# Patient Record
Sex: FEMALE | Race: WHITE
[De-identification: names, ages, dates, MRNs, and addresses within clinical notes are randomized per-mention and may not be internally consistent; named-entity substitution may affect disease eponyms.]

---

## 2017-03-07 ENCOUNTER — HOSPITAL ENCOUNTER (EMERGENCY)
Dept: HOSPITAL 45 - C.EDB | Age: 27
Discharge: HOME | End: 2017-03-07
Payer: COMMERCIAL

## 2017-03-07 VITALS
HEIGHT: 67.01 IN | BODY MASS INDEX: 33.91 KG/M2 | BODY MASS INDEX: 33.91 KG/M2 | HEIGHT: 67.01 IN | WEIGHT: 216.05 LBS | WEIGHT: 216.05 LBS

## 2017-03-07 VITALS — SYSTOLIC BLOOD PRESSURE: 115 MMHG | HEART RATE: 61 BPM | DIASTOLIC BLOOD PRESSURE: 64 MMHG | OXYGEN SATURATION: 100 %

## 2017-03-07 VITALS — TEMPERATURE: 98.42 F

## 2017-03-07 DIAGNOSIS — R51: ICD-10-CM

## 2017-03-07 DIAGNOSIS — Z98.51: ICD-10-CM

## 2017-03-07 DIAGNOSIS — R00.2: Primary | ICD-10-CM

## 2017-03-07 DIAGNOSIS — R07.2: ICD-10-CM

## 2017-03-07 LAB
ALP SERPL-CCNC: 70 U/L (ref 45–117)
ALT SERPL-CCNC: 68 U/L (ref 12–78)
ANION GAP SERPL CALC-SCNC: 11 MMOL/L (ref 3–11)
AST SERPL-CCNC: 38 U/L (ref 15–37)
BASOPHILS # BLD: 0 K/UL (ref 0–0.2)
BASOPHILS NFR BLD: 0 %
BUN SERPL-MCNC: 8 MG/DL (ref 7–18)
BUN/CREAT SERPL: 10.9 (ref 10–20)
CALCIUM SERPL-MCNC: 9.4 MG/DL (ref 8.5–10.1)
CHLORIDE SERPL-SCNC: 106 MMOL/L (ref 98–107)
CKMB/CK RATIO: 1.1 (ref 0–3)
CO2 SERPL-SCNC: 24 MMOL/L (ref 21–32)
COMPLETE: YES
CREAT CL PREDICTED SERPL C-G-VRATE: 129.6 ML/MIN
CREAT SERPL-MCNC: 0.77 MG/DL (ref 0.6–1.2)
EOSINOPHIL NFR BLD AUTO: 186 K/UL (ref 130–400)
GLUCOSE SERPL-MCNC: 89 MG/DL (ref 70–99)
HCT VFR BLD CALC: 40.1 % (ref 37–47)
IG%: 0.2 %
IMM GRANULOCYTES NFR BLD AUTO: 39.9 %
INR PPP: 0.9 (ref 0.9–1.1)
LYMPHOCYTES # BLD: 2.35 K/UL (ref 1.2–3.4)
MAGNESIUM SERPL-MCNC: 2.1 MG/DL (ref 1.8–2.4)
MCH RBC QN AUTO: 30.9 PG (ref 25–34)
MCHC RBC AUTO-ENTMCNC: 35.4 G/DL (ref 32–36)
MCV RBC AUTO: 87.2 FL (ref 80–100)
MONOCYTES NFR BLD: 4.6 %
NEUTROPHILS # BLD AUTO: 0.8 %
NEUTROPHILS NFR BLD AUTO: 54.5 %
PARTIAL THROMBOPLASTIN RATIO: 1.1
PMV BLD AUTO: 10.3 FL (ref 7.4–10.4)
POINT OF CARE TROPONIN I: 0 NG/ML (ref 0–0.04)
POTASSIUM SERPL-SCNC: 3.7 MMOL/L (ref 3.5–5.1)
PREG INTERNAL NEGATIVE QC: (no result)
PREG INTERNAL POSITIVE QC: (no result)
PROTHROMBIN TIME: 10.1 SECONDS (ref 9–12)
RBC # BLD AUTO: 4.6 M/UL (ref 4.2–5.4)
SODIUM SERPL-SCNC: 141 MMOL/L (ref 136–145)
TSH SERPL-ACNC: 1.83 UIU/ML (ref 0.3–4.5)
WBC # BLD AUTO: 5.89 K/UL (ref 4.8–10.8)

## 2017-03-07 NOTE — EMERGENCY ROOM VISIT NOTE
History


Report prepared by Martinez:  Janice Miranda


Under the Supervision of:  Dr. Phil Mcmahan D.O.


First contact with patient:  16:53


Chief Complaint:  PALPITATIONS


Stated Complaint:  HEART PALPITATIONS, POUNDING, DIZZINESS





History of Present Illness


The patient is a 27 year old female who presents to the Emergency Room with 

complaints of episodes of intermittent heart palpitations starting 2 days PTA. 

The patient states that she was sitting on her computer and when she stood up 

she was lightheaded and had an episode of heart racing and since the episode 2 

days ago she has had a persistent headache and chest pain. The patient states 

today she had another episode of the heart racing along with nausea causing her 

to come to the ED. The patient states she has had similar episodes in the past 

and she did see her PCP which gave her medications for panic attacks. The 

patient states that she had similar headaches in the past but that they usually 

go away but that this headache has persisted. The patient states she also has 

some leg soreness with her symptoms but denies any leg pain or swelling. She 

denies any SOB or vomiting. She states she does not take birth control and has 

a history of a tubal ligation. She denies any chance of pregnancy and states 

that she is currently having her menstrual period. The patient states she has 

no history of thyroid problems and states the only medications she takes are 

the ones she was prescribed for her panic attacks. She states she took those 

medications when she experiencing these episodes but they did not resolve her 

symptoms.





   Source of History:  patient


   Onset:  2 days PTA


   Position:  chest


   Timing:  intermittent


   Associated Symptoms:  + chest pain, + headache, + nausea, No vomiting


Note:


Associated symptoms: leg soreness, lightheaded


Patient denies any leg pain or swelling.





Review of Systems


See HPI for pertinent positives & negatives. A total of 10 systems reviewed and 

were otherwise negative.





Past Medical & Surgical


Surgical Problems:


(1) History of tubal ligation








Family History





Adopted





Social History


Smoking Status:  Never Smoker


Alcohol Use:  none


Drug Use:  none


Marital Status:  


Housing Status:  lives with family


Occupation Status:  employed, student





Current/Historical Medications


Scheduled


Citalopram Hydrobromide (Citalopram Hydrobromide), 20 MG PO DAILY





Scheduled PRN


Lorazepam (Ativan), 0.5 MG PO TID PRN for Anxiety


Oxycodone Immediate Rel Tab (Roxicodone Ir), 1-2 TAB PO Q4H PRN for Severe Pain





Allergies


Coded Allergies:  


     [place] (Unverified  Allergy, Severe, FACIAL SWELLING, 6/4/16)


     Amoxicillin (Verified  Allergy, Mild, rash, 6/4/16)


     Penicillins (Verified  Allergy, Mild, HIVES, 6/4/16)


 RASH





Physical Exam


Vital Signs











  Date Time  Temp Pulse Resp B/P Pulse Ox O2 Delivery O2 Flow Rate FiO2


 


3/7/17 18:57  61 21 115/64 100   


 


3/7/17 17:17  72      


 


3/7/17 17:11     98 Room Air  


 


3/7/17 16:52 36.9 83 18 122/85 98 Room Air  











Physical Exam


GENERAL:  Patient is awake, alert, and in no acute distress. Patient is resting 

comfortably and showing no signs of anxiety


EYES: The conjunctivae are clear.  The pupils are round and reactive. 


EARS, NOSE, MOUTH AND THROAT: The nose is without any evidence of any 

deformity. Mucous membranes are moist tongue is midline 


NECK: The neck is nontender and supple.


RESPIRATORY: Normal respiratory effort is noted there is no evidence of 

wheezing rhonchi or rales


CARDIOVASCULAR:  Tachycardic with regular rhythm, no definite murmur noted to 

auscultation.


GASTROINTESTINAL: The abdomen is soft. Bowel sounds are present in all 

quadrants. Abdomen is nontender


MUSCULOSKELETAL/EXTREMITIES: There is no evidence of gross deformity full range 

of motion is noted in the hips and shoulders


SKIN: There is no obvious evidence of any rash. There are no petechiae, pallor 

or cyanosis noted. 


NEUROLOGIC:  Patient is awake alert and oriented x3 strength is symmetric 

patellar reflexes are 2+ bilaterally





Medical Decision & Procedures


ER Provider


Diagnostic Interpretation:


X-ray results as stated below per interpretation by me and the radiologist. 





CHEST ONE VIEW PORTABLE





CLINICAL HISTORY: ABDOMINAL PAIN/GI pain. Nausea.





COMPARISON STUDY:  1/1/2014





FINDINGS: The bones soft tissues and hemidiaphragms are normal. The


cardiomediastinal silhouette is normal. The lungs are clear. The pulmonary


vasculature is normal. 





IMPRESSION:  Negative chest. 














Electronically signed by:  Mikhail Fox M.D.


3/7/2017 5:15 PM





Dictated Date/Time:  3/7/2017 5:14 PM





CT results as stated below per my review and radiologist interpretation. 





HEAD CT NONCONTRAST





CT DOSE: 537.48 mGy.cm





HISTORY: Mental status change  HA





TECHNIQUE: Multiaxial CT images of the head were performed without the use of


intravenous contrast.





Comparison: None.





Findings: The paranasal sinuses and mastoid air cells are clear. The calvarium


and skull base are intact. The ventricles and sulci are within normal limits.


There is no mass, hematoma, midline shift, or acute infarct.





Impression:


No acute intracranial abnormality. 











Electronically signed by:  Mikhail Fox M.D.


3/7/2017 5:37 PM





Dictated Date/Time:  3/7/2017 5:36 PM





Laboratory Results


3/7/17 17:20








Red Blood Count 4.60, Mean Corpuscular Volume 87.2, Mean Corpuscular Hemoglobin 

30.9, Mean Corpuscular Hemoglobin Concent 35.4, Mean Platelet Volume 10.3, 

Neutrophils (%) (Auto) 54.5, Lymphocytes (%) (Auto) 39.9, Monocytes (%) (Auto) 

4.6, Eosinophils (%) (Auto) 0.8, Basophils (%) (Auto) 0.0, Neutrophils # (Auto) 

3.21, Lymphocytes # (Auto) 2.35, Monocytes # (Auto) 0.27, Eosinophils # (Auto) 

0.05, Basophils # (Auto) 0.00





3/7/17 17:20

















Test


  3/7/17


17:20 3/7/17


17:26


 


White Blood Count


  5.89 K/uL


(4.8-10.8) 


 


 


Red Blood Count


  4.60 M/uL


(4.2-5.4) 


 


 


Hemoglobin


  14.2 g/dL


(12.0-16.0) 


 


 


Hematocrit 40.1 % (37-47)  


 


Mean Corpuscular Volume


  87.2 fL


() 


 


 


Mean Corpuscular Hemoglobin


  30.9 pg


(25-34) 


 


 


Mean Corpuscular Hemoglobin


Concent 35.4 g/dl


(32-36) 


 


 


Platelet Count


  186 K/uL


(130-400) 


 


 


Mean Platelet Volume


  10.3 fL


(7.4-10.4) 


 


 


Neutrophils (%) (Auto) 54.5 %  


 


Lymphocytes (%) (Auto) 39.9 %  


 


Monocytes (%) (Auto) 4.6 %  


 


Eosinophils (%) (Auto) 0.8 %  


 


Basophils (%) (Auto) 0.0 %  


 


Neutrophils # (Auto)


  3.21 K/uL


(1.4-6.5) 


 


 


Lymphocytes # (Auto)


  2.35 K/uL


(1.2-3.4) 


 


 


Monocytes # (Auto)


  0.27 K/uL


(0.11-0.59) 


 


 


Eosinophils # (Auto)


  0.05 K/uL


(0-0.5) 


 


 


Basophils # (Auto)


  0.00 K/uL


(0-0.2) 


 


 


RDW Standard Deviation


  39.6 fL


(36.4-46.3) 


 


 


RDW Coefficient of Variation


  12.4 %


(11.5-14.5) 


 


 


Immature Granulocyte % (Auto) 0.2 %  


 


Immature Granulocyte # (Auto)


  0.01 K/uL


(0.00-0.02) 


 


 


Prothrombin Time


  10.1 SECONDS


(9.0-12.0) 


 


 


Prothromb Time International


Ratio 0.9 (0.9-1.1) 


  


 


 


Activated Partial


Thromboplast Time 27.8 SECONDS


(21.0-31.0) 


 


 


Partial Thromboplastin Ratio 1.1  


 


Anion Gap


  11.0 mmol/L


(3-11) 


 


 


Est Creatinine Clear Calc


Drug Dose 129.6 ml/min 


  


 


 


Estimated GFR (


American) 120.9 


  


 


 


Estimated GFR (Non-


American 104.3 


  


 


 


BUN/Creatinine Ratio 10.9 (10-20)  


 


Calcium Level


  9.4 mg/dl


(8.5-10.1) 


 


 


Magnesium Level


  2.1 mg/dl


(1.8-2.4) 


 


 


Total Bilirubin


  0.4 mg/dl


(0.2-1) 


 


 


Direct Bilirubin


  < 0.1 mg/dl


(0-0.2) 


 


 


Aspartate Amino Transf


(AST/SGOT) 38 U/L (15-37) 


  


 


 


Alanine Aminotransferase


(ALT/SGPT) 68 U/L (12-78) 


  


 


 


Alkaline Phosphatase


  70 U/L


() 


 


 


Total Creatine Kinase


  110 U/L


() 


 


 


Creatine Kinase MB


  1.2 ng/ml


(0.5-3.6) 


 


 


Creatine Kinase MB Ratio 1.1 (0-3.0)  


 


Total Protein


  7.5 gm/dl


(6.4-8.2) 


 


 


Albumin


  3.8 gm/dl


(3.4-5.0) 


 


 


Lipase


  150 U/L


() 


 


 


Thyroid Stimulating Hormone


(TSH) 1.830 uIu/ml


(0.300-4.500) 


 


 


Free Thyroxine


  1.11 ng/dl


(0.80-1.60) 


 


 


Human Chorionic Gonadotropin,


Qual NEG (NEG) 


  


 


 


Bedside D-Dimer


  


  130 ng/mlFEU


(0-450)


 


Bedside Troponin I


  


  0.000 ng/ml


(0-0.045)





Laboratory results per my review.





Medications Administered











 Medications


  (Trade)  Dose


 Ordered  Sig/Nicolasa


 Route  Start Time


 Stop Time Status Last Admin


Dose Admin


 


 Ketorolac


 Tromethamine 30 mg  30 mg  NOW  STAT


 IV  3/7/17 17:00


 3/7/17 17:03 DC 3/7/17 17:18


30 MG


 


 Sodium Chloride  1,000 ml @ 


 999 mls/hr  Q1H1M STAT


 IV  3/7/17 17:00


 3/7/17 18:01 DC 3/7/17 17:18


999 MLS/HR


 


 Promethazine HCl/


 Sodium Chloride


  (Phenergan Inj/


 Nss 50ml)  50.5 ml @ 


 204 mls/hr  NOW  STAT


 IV  3/7/17 17:00


 3/7/17 17:14 DC 3/7/17 17:18


204 MLS/HR











ECG


Indication:  chest pain


Rate (beats per minute):  75


Rhythm:  normal sinus


Findings:  no ectopy, other (No ST segment abnormalities )


Comparison ECG Date:  no prior available





ED Course


1656: The patient was evaluated in room B2. A complete history and physical 

examination were performed. 





1700: Ordered Promethazine HCl 12.5 mg/ Sodium Chloride 50.5 ml @ 204 mls/hr IV

, NSS 1,000 ml @ 999 mls/hr IV, Toradol Inj 30 mg IV. 





1804: Upon reevaluation, the patient is resting comfortably. I discussed the 

results and treatment plan with her. She verbalized agreement of the treatment 

plan. The patient was discharged home.





Medical Decision


Prior records/ancillary studies reviewed.


Triage Nursing notes reviewed





The patient's history was concerning for palpitations.


Differential diagnosis:


Etiologies such as premature contractions, electrolyte abnormality, cardiac 

dysrhythmia, thyroid dysfunction, pulmonary embolism, infection, 

gastrointestinal, as well as others were entertained.





The patient is a 27-year-old female who presented to the emergency department 

for an evaluation of palpitations and chest pain. The patient states that she's 

been having symptoms for the last few days. She also has been extrinsic left-

sided headache which is very significant. The patient not have meningismus or 

fever. She did not have any focal neurologic deficit. Her EKG did not show any 

acute ST segment abnormalities in her cardiac biomarkers were negative despite 

having ongoing pain through the day. The patient was treated with IV fluids IV 

pain medicine and IV antiemetics. I discussed patient's laboratory and 

radiographic studies with her. She was encouraged to rest and avoid any 

strenuous activity. She was encouraged to continue all medications as 

prescribed. She has a follow-up appointment scheduled with her primary care 

physician tomorrow and she was encouraged to keep this appointment and discuss 

the possibility that she may require further studies such as an echocardiogram 

or a Holter monitor to further evaluate the cause of her symptoms. She was also 

encouraged to return to the emergency department immediately if symptoms change 

worsen or the need arises.





Impression





 Primary Impression:  


 Heart palpitations


 Additional Impressions:  


 Acute headache


 Substernal chest pain





Scribe Attestation


The scribe's documentation has been prepared under my direction and personally 

reviewed by me in its entirety. I confirm that the note above accurately 

reflects all work, treatment, procedures, and medical decision making performed 

by me.





Departure Information


Dispostion


Home / Self-Care





Prescriptions





Oxycodone Immediate Rel Tab (ROXICODONE IR) 5 Mg Tab


1-2 TAB PO Q4H Y for Severe Pain, #24 TAB


   Prov: Phil Mcmahan, DO         3/7/17





Referrals


SILVESTRE West M.D. (MEDICAL) (PCP)





Forms


HOME CARE DOCUMENTATION FORM,                                                 

               IMPORTANT VISIT INFORMATION, WORK / SCHOOL INSTRUCTIONS





Patient Instructions


My [facility]





Additional Instructions





Follow-up with your family  tomorrow as scheduled. Rest and avoid any 

strenuous activity. Drink plenty of clear liquids. Return to the emergency 

department if symptoms worsen or if need arises. Discuss with your family 

doctor the possibility that you may require an echocardiogram or a Holter 

monitor to further evaluate the cause of your symptoms. Continue to use Motrin 

and Tylenol as directed for pain.





Problem Qualifiers

## 2017-03-07 NOTE — DIAGNOSTIC IMAGING REPORT
HEAD CT NONCONTRAST



CT DOSE: 537.48 mGy.cm



HISTORY: Mental status change  HA



TECHNIQUE: Multiaxial CT images of the head were performed without the use of

intravenous contrast.



Comparison: None.



Findings: The paranasal sinuses and mastoid air cells are clear. The calvarium

and skull base are intact. The ventricles and sulci are within normal limits.

There is no mass, hematoma, midline shift, or acute infarct.



Impression:

No acute intracranial abnormality. 







Electronically signed by:  Mikhail Fox M.D.

3/7/2017 5:37 PM



Dictated Date/Time:  3/7/2017 5:36 PM

## 2017-03-07 NOTE — DIAGNOSTIC IMAGING REPORT
CHEST ONE VIEW PORTABLE



CLINICAL HISTORY: ABDOMINAL PAIN/GI pain. Nausea.



COMPARISON STUDY:  1/1/2014



FINDINGS: The bones soft tissues and hemidiaphragms are normal. The

cardiomediastinal silhouette is normal. The lungs are clear. The pulmonary

vasculature is normal. 



IMPRESSION:  Negative chest. 









Electronically signed by:  Mikhail Fox M.D.

3/7/2017 5:15 PM



Dictated Date/Time:  3/7/2017 5:14 PM

## 2017-05-28 ENCOUNTER — HOSPITAL ENCOUNTER (EMERGENCY)
Dept: HOSPITAL 45 - C.EDB | Age: 27
Discharge: HOME | End: 2017-05-28
Payer: SELF-PAY

## 2017-05-28 VITALS — SYSTOLIC BLOOD PRESSURE: 116 MMHG | HEART RATE: 83 BPM | DIASTOLIC BLOOD PRESSURE: 74 MMHG | OXYGEN SATURATION: 95 %

## 2017-05-28 VITALS
HEIGHT: 67.01 IN | WEIGHT: 224.21 LBS | HEIGHT: 67.01 IN | BODY MASS INDEX: 35.19 KG/M2 | BODY MASS INDEX: 35.19 KG/M2 | WEIGHT: 224.21 LBS

## 2017-05-28 VITALS — TEMPERATURE: 98.24 F

## 2017-05-28 DIAGNOSIS — S33.5XXA: Primary | ICD-10-CM

## 2017-05-28 DIAGNOSIS — M54.5: ICD-10-CM
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
## 2017-05-28 NOTE — EMERGENCY ROOM VISIT NOTE
History


First contact with patient:  18:28


Chief Complaint:  BACK PAIN


Stated Complaint:  HURT LOWER BACK ON CANOE TRIP,CAN'T STAND STRAIGHT





History of Present Illness


The patient is a 27 year old female who presents to the Emergency Room via 

private vehicle accompanied by  with complaints of "her lower back on 

canoe trip, can't stand straight".  The patient states that yesterday morning 

around 8 AM she was packing up from a canoe trip, he may for the next trip when 

she stood up and felt pain in the low back.  She states that she stood up 

quickly.  She states that the pain currently is an 8/10.  There is been no 

trauma or injury.  She is tried Tylenol and Vicodin without relief.  She denies 

any urinary symptoms.  She denies any lower extremity weakness, bowel or 

bladder incontinence, numbness or tingling in the genital region, history of 

blood clots, speech troubles, abdominal pain, fevers, chills, recent troubles 

or fall.





Review of Systems


A complete 6-point Review of Systems was discussed with the patient, with 

pertinent positives and negatives listed in the History of Present Illness. All 

remaining Review of Systems questions can be considered negative unless 

otherwise specified.





Past Medical/Surgical History


Medical Problems:


(1) No significant past medical history


Surgical Problems:


(1) History of cholecystectomy


(2) History of tubal ligation








Family History





Adopted





Social History


Smoking Status:  Never Smoker


Alcohol Use:  none


Drug Use:  none


Marital Status:  


Housing Status:  lives with family


Occupation Status:  employed, student





Current/Historical Medications


Scheduled


Cyclobenzaprine Hcl (Flexeril), 10 MG PO TID





Scheduled PRN


Oxycodone Ir (Roxicodone Ir), 1-2 TAB PO Q4H PRN for Pain





Allergies


Coded Allergies:  


     [place] (Unverified  Allergy, Severe, FACIAL SWELLING, 5/28/17)


     Amoxicillin (Verified  Allergy, Mild, rash, 5/28/17)


     Penicillins (Verified  Allergy, Mild, HIVES, 5/28/17)


 RASH





Physical Exam


Vital Signs











  Date Time  Temp Pulse Resp B/P Pulse Ox O2 Delivery O2 Flow Rate FiO2


 


5/28/17 20:39  83 18 116/74 95 Room Air  


 


5/28/17 18:19 36.8 84 18 127/77 97 Room Air  











Physical Exam


VITAL SIGNS - Vital signs and nursing notes were reviewed.  Patient is afebrile

, normotensive, non-tachycardic and is saturating well on room air 97%.


GENERAL -27-year-old female appearing her stated age who is in no acute 

distress. Communicates well with provider and answers questions appropriately.


SKIN - Without rashes.  The skin overlying the lumbar spine is unremarkable.


HEAD - NC/AT.


LUNGS - Chest wall symmetric without accessory muscle use, intercostals 

retractions, or central cyanosis. Normal vesicular breath sounds CTA B/L. No 

wheezes, rales, or rhonchi appreciated.


CARDIAC - RRR with S1/S2. No murmur, rubs, or gallops appreciated. 


ABDOMEN - Abdominal contour without pulsations or visible masses. BS 

normoactive all four quadrants. No tenderness, palpable masses, 

hepatosplenomegaly, or ascites noted.


EXTREMITIES - No clubbing or peripheral cyanosis. No pretibial edema present.  +

5/5 strength noted in UE/LE bilaterally.


MUSCULOSKELETAL: There is tenderness to palpation overlying the inferior 

paraspinous musculature of the lumbar spine.  No bony deformity noted to 

inspection.


NEUROLOGIC - Cranial nerves II through XII grossly intact.  Patellar reflexes +2

/4.


PSYCH -  Pt is very pleasant and interacts well with examiner.





Medical Decision & Procedures


Medications Administered











 Medications


  (Trade)  Dose


 Ordered  Sig/Nicolasa


 Route  Start Time


 Stop Time Status Last Admin


Dose Admin


 


 Morphine Sulfate


  (MoRPHine


 SULFATE INJ)  10 mg  NOW  STAT


 IM  5/28/17 18:41


 5/28/17 18:44 DC 5/28/17 18:54


10 MG


 


 Ondansetron HCl


  (Zofran Odt)  4 mg  NOW  STAT


 PO  5/28/17 18:41


 5/28/17 18:44 DC 5/28/17 18:54


4 MG


 


 Dexamethasone


 Sodium Phosphate


  (Decadron Inj)  10 mg  NOW  STAT


 IM  5/28/17 18:41


 5/28/17 18:44 DC 5/28/17 18:54


10 MG


 


 Cyclobenzaprine


 HCl


  (Flexeril Tab)  10 mg  NOW  STAT


 PO  5/28/17 18:54


 5/28/17 18:55 DC 5/28/17 19:01


10 MG


 


 Miscellaneous


 Medication


  (Gi Cocktail)  24 ml  NOW  STAT


 PO  5/28/17 19:41


 5/28/17 19:42 DC 5/28/17 19:46


24 ML


 


 Al Hydroxide/Mg


 Hydroxide


  (Maalox Susp)  30 ml  STK-MED ONCE


 .ROUTE  5/28/17 19:44


 5/28/17 19:45 DC 5/28/17 19:46


30 ML


 


 Lidocaine HCl


  (Viscous


 Lidocaine 2% Soln)  20 ml  STK-MED ONCE


 .ROUTE  5/28/17 19:44


 5/28/17 19:45 DC 5/28/17 19:46


20 ML


 


 Oxycodone HCl


  (Roxicodone


 Immediate Rel 5MG


 Home Pack)  1 homepack  UD  STAT


 PO  5/28/17 20:28


 5/28/17 20:29 DC 5/28/17 20:39


1 HOMEPACK


 


 Cyclobenzaprine


 HCl


  (FLEXERIL 10MG


 Home Pack)  1 homepack  UD  STAT


 PO  5/28/17 20:28


 5/28/17 20:29 DC 5/28/17 20:39


1 HOMEPACK











Medical Decision


Patient was seen and evaluated as above.  After obtaining a thorough history 

and physical examination radiographs was obtained the lumbar spine.  She was 

treated symptomatically with 10 mg of morphine intramuscularly, 4 mg of Zofran 

for her potential nausea from the morphine followed by 10 mg of Decadron IM.  

The patient is likely experiencing lumbar strain, with minimal lumbar 

radiculopathy bilaterally.  She was reevaluated and was feeling better, but did 

note that she was experiencing epigastric and chest pain.  This is similar to 

her previous pain of which she will reflux.  She was given a GI cocktail and 

the pain was relieved.  I do not suspect any cardiac or emergent lung etiology.

  The patient has time appears stable for discharge, and the radiograph results 

as above do not reveal any acute process.  Patient was educated upon these 

findings.  She is to follow-up with her family doctor, or spine specialist if 

the need arises.  She was educated upon worrisome symptoms which to return, had 

questions prior to discharge, and was discharged home in good condition.  She 

will be given Flexeril, and OxyIR for pain.





In evaluation treatment this patient following differential diagnoses were 

entertained: Cauda equina syndrome, Lumbar Strain, fracture, subluxation, 

abscess, meningitis, among others.





[place] Drug Monitoring Program


Search Results:  patient reviewed within database, no issues identified





Impression





 Primary Impression:  


 Strain of lumbar region


 Additional Impression:  


 Low back pain radiating to both legs





Departure Information


Dispostion


Home / Self-Care





Condition


GOOD





Prescriptions





Cyclobenzaprine Hcl (FLEXERIL) 10 Mg Tab


10 MG PO TID, #15 TAB


   Prov: Bryon Guajardo PA-C         5/28/17 


Oxycodone Ir (Roxicodone Ir) 5 Mg Tab


1-2 TAB PO Q4H Y for Pain, #20 TAB


   For Initial Treatment


   Prov: Bryon Guajardo PA-C         5/28/17





Referrals


SILVESTRE West M.D. (MEDICAL) (PCP)








Navneet Jacobo, DO





Patient Instructions


My [facility]





Additional Instructions





You have been treated in the Emergency Department for Back Pain. You have 

received pain medicine in the emergency department which impairs your ability 

to operate a vehicle. It is illegal for you to drive after receiving these 

medicines. 





You have been prescribed Oxy IR to be used for pain control. This is a narcotic 

medication. You cannot drive or consume alcohol while on this medicine. This 

medicine should only be used for pain that cannot be controlled with over-the-

counter pain medicines.





You have been prescribed Flexeril (cyclobenzaprine) 1 tabs orally, three times 

per day. Do NOT exceed 30 mg (6 tabs) per day. Take your first dose at bedtime 

as it can make you drowsy. Always take all medications as prescribed.





For pain control, you can use the following over-the-counter medicines (if >11 yo):





- Regular strength (325mg/tab) Tylenol (acetaminophen) 2 tabs every 4-6 hours 

as needed. Do not exceed 12 tablets in a 24 hour period. Avoid taking more than 

3 grams (3000 mg) of Tylenol per day. This includes any other sources of 

acetaminophen you may take on a regular basis.





- Regular strength (200 mg/tab) Advil (ibuprofen) 1-2 tabs every 4-6 hours as 

needed. Do not exceed a dose of 3200 mg per day.





If this is an acute injury, ice can be applied to the area of pain for the 

first 3 days to help decrease pain and inflammation. After the first 3 days, a 

heating pad can be used over the area for continued soothing relief.





You should schedule a follow-up appointment in 2-3 days with your Primary Care 

Provider for further evaluation and treatment of your back pain. If needed, you 

may follow with a back specialist (Dr. Jacobo) by calling their office as soon 

as possible.





Return to the Emergency Department if your current symptoms worsen despite 

treatment course outlined above, or if you develop any of the following symptoms

: intractable pain despite aforementioned treatment course, loss of control of 

your bowel or bladder, numbness or tingling in your groin, or development of a 

fever.





Please return to the emergency department with any new/concerning symptoms.





Problem Qualifiers

## 2017-05-28 NOTE — DIAGNOSTIC IMAGING REPORT
LUMBAR SPINE 5 VIEWS



HISTORY:      Low back pain, no injury



COMPARISON: None.



FINDINGS: There is no fracture.  No subluxation. Mild disc space narrowing at

L5-S1. Remaining disc spaces are preserved. Prior cholecystectomy. The sacrum is

unremarkable.



IMPRESSION:  

No fracture or subluxation within the lumbar spine. Mild degenerative disc

disease at L5-S1.







Electronically signed by:  Lobo Looney M.D.

5/28/2017 7:37 PM



Dictated Date/Time:  5/28/2017 7:34 PM

## 2017-08-22 ENCOUNTER — HOSPITAL ENCOUNTER (EMERGENCY)
Dept: HOSPITAL 45 - C.EDB | Age: 27
Discharge: HOME | End: 2017-08-22
Payer: SELF-PAY

## 2017-08-22 VITALS
SYSTOLIC BLOOD PRESSURE: 105 MMHG | DIASTOLIC BLOOD PRESSURE: 74 MMHG | TEMPERATURE: 98.06 F | HEART RATE: 79 BPM | OXYGEN SATURATION: 98 %

## 2017-08-22 VITALS
BODY MASS INDEX: 32.66 KG/M2 | WEIGHT: 208.12 LBS | BODY MASS INDEX: 32.66 KG/M2 | WEIGHT: 208.12 LBS | HEIGHT: 67.01 IN | HEIGHT: 67.01 IN

## 2017-08-22 DIAGNOSIS — Z98.51: ICD-10-CM

## 2017-08-22 DIAGNOSIS — Z79.899: ICD-10-CM

## 2017-08-22 DIAGNOSIS — R10.13: Primary | ICD-10-CM

## 2017-08-22 DIAGNOSIS — F17.210: ICD-10-CM

## 2017-08-22 DIAGNOSIS — Z90.49: ICD-10-CM

## 2017-08-22 DIAGNOSIS — R11.0: ICD-10-CM

## 2017-08-22 LAB
ALP SERPL-CCNC: 60 U/L (ref 45–117)
ALT SERPL-CCNC: 60 U/L (ref 12–78)
ANION GAP SERPL CALC-SCNC: 17 MMOL/L (ref 16–25)
ANION GAP SERPL CALC-SCNC: 6 MMOL/L (ref 3–11)
APPEARANCE UR: CLEAR
AST SERPL-CCNC: 36 U/L (ref 15–37)
BASOPHILS # BLD: 0.01 K/UL (ref 0–0.2)
BASOPHILS NFR BLD: 0.1 %
BILIRUB UR-MCNC: (no result) MG/DL
BUN SERPL-MCNC: 6 MG/DL (ref 7–18)
BUN/CREAT SERPL: 5.6 (ref 10–20)
CA-I BLD-SCNC: 1.24 MMOL/L (ref 1.12–1.32)
CALCIUM SERPL-MCNC: 9.4 MG/DL (ref 8.5–10.1)
CHLORIDE BLD-SCNC: 104 MEQ/L (ref 101–112)
CHLORIDE SERPL-SCNC: 107 MMOL/L (ref 98–107)
CO2 SERPL-SCNC: 28 MMOL/L (ref 21–32)
COLOR UR: YELLOW
COMPLETE: YES
CREAT BLD-MCNC: 0.9 MG/DL (ref 0.6–1.3)
CREAT CL PREDICTED SERPL C-G-VRATE: 97.9 ML/MIN
CREAT SERPL-MCNC: 1 MG/DL (ref 0.6–1.2)
EOSINOPHIL NFR BLD AUTO: 199 K/UL (ref 130–400)
GLUCOSE SERPL-MCNC: 80 MG/DL (ref 70–99)
HCT VFR BLD AUTO: 47 % (ref 37–47)
HCT VFR BLD CALC: 45.8 % (ref 37–47)
HGB BLD-MCNC: 16 G/DL (ref 12–16)
IG%: 0.1 %
IMM GRANULOCYTES NFR BLD AUTO: 32.5 %
ISTAT CARBON DIOXIDE: 25 MEQ/L (ref 24–31)
LYMPHOCYTES # BLD: 2.19 K/UL (ref 1.2–3.4)
MANUAL MICROSCOPIC REQUIRED?: NO
MCH RBC QN AUTO: 30.5 PG (ref 25–34)
MCHC RBC AUTO-ENTMCNC: 33.2 G/DL (ref 32–36)
MCV RBC AUTO: 91.8 FL (ref 80–100)
MONOCYTES NFR BLD: 4.8 %
NEUTROPHILS # BLD AUTO: 1.2 %
NEUTROPHILS NFR BLD AUTO: 61.3 %
NITRITE UR QL STRIP: (no result)
PH UR STRIP: 6.5 [PH] (ref 4.5–7.5)
PMV BLD AUTO: 10.6 FL (ref 7.4–10.4)
POTASSIUM SERPL-SCNC: 3.4 MMOL/L (ref 3.5–5.1)
RBC # BLD AUTO: 4.99 M/UL (ref 4.2–5.4)
REVIEW REQ?: NO
SODIUM BLD-SCNC: 142 MEQ/L (ref 135–144)
SODIUM SERPL-SCNC: 141 MMOL/L (ref 136–145)
SP GR UR STRIP: 1.01 (ref 1–1.03)
URINE BILL WITH OR WITHOUT MIC: (no result)
UROBILINOGEN UR-MCNC: (no result) MG/DL
WBC # BLD AUTO: 6.73 K/UL (ref 4.8–10.8)

## 2017-08-22 NOTE — EMERGENCY ROOM VISIT NOTE
History


Report prepared by Martinez:  Teodora Mendoza


Under the Supervision of:  Dr. Terry Trujillo M.D.


First contact with patient:  17:57


Chief Complaint:  ABDOMINAL PAIN


Stated Complaint:  BURNING PAIN IN STOMACH NOT RELIEVED BY ANYTHING


Nursing Triage Summary:  


Pt c/o burning mid upper abdominal pain since last night. Tried zantac and 


maalox.  Nausea. Denies v/d





Urinating frequently for past 2 days.





Last BM Yesterday AM "it's possible I'm constipated"





History of Present Illness


The patient is a 27 year old female who presents to the Emergency Room with 

complaints of worsening abdominal pain starting last night. The patient 

describes the pain as a burning sensation. She states that she took some Zantac 

and Maalox with no relief. She notes that laying down and and laying on her 

side help relieve the pain. The patient complains of nausea. She notes she took 

left over nausea medicine from a previous visit with no relief. The patient 

denies the chance of pregnancy. She notes her LNMP was 2 and a half weeks ago.





   Source of History:  patient


   Onset:  last night


   Position:  abdomen


   Quality:  burning


   Timing:  worsening


   Modifying Factors (Relieving):  rest (laying down and laying on her side)


   Associated Symptoms:  + nausea


Note:


The patient denies the chance of pregnancy.





Review of Systems


See HPI for pertinent positives & negatives. A total of 10 systems reviewed and 

were otherwise negative.





Past Medical & Surgical


Medical Problems:


(1) No significant past medical history


Surgical Problems:


(1) History of cholecystectomy


(2) History of tubal ligation








Family History





Adopted





Social History


Smoking Status:  Current Every Day Smoker


Alcohol Use:  none


Drug Use:  none


Marital Status:  


Housing Status:  lives with family


Occupation Status:  employed, student





Current/Historical Medications


Scheduled PRN


Aluminum/Magnesium/Simeth (Maalox Max Susp), 1 DOSE PO UD PRN for GI Upset


Meloxicam (Mobic), 15 MG PO DAILY PRN for Pain


Oxycodone/Acetaminophen 5MG/325MG (Percocet 5MG/325MG), 1-2 TAB PO Q4H PRN for 

Pain


Ranitidine Hcl (Zantac), 75 MG PO BID PRN for Acid Reflux





Allergies


Coded Allergies:  


     [place] (Unverified  Allergy, Severe, FACIAL SWELLING, 5/28/17)


     Amoxicillin (Verified  Allergy, Mild, rash, 5/28/17)


     Penicillins (Verified  Allergy, Mild, HIVES, 5/28/17)


 RASH


     NSAIDs (Verified  Allergy, Unknown, Unknown, was instructed in the pass to 

avoid, 8/22/17)





Physical Exam


Vital Signs











  Date Time  Temp Pulse Resp B/P (MAP) Pulse Ox O2 Delivery O2 Flow Rate FiO2


 


8/22/17 21:39 36.7 79 19 105/74 98   


 


8/22/17 21:35  79 19  98   


 


8/22/17 21:30    105/74    


 


8/22/17 21:05  63 16  99   


 


8/22/17 21:00    114/71    


 


8/22/17 20:30  62 17 121/65 100   


 


8/22/17 20:15  64 19  99   


 


8/22/17 20:11    106/75    


 


8/22/17 19:45  63 16  100   


 


8/22/17 19:38    107/74    


 


8/22/17 19:30  70 14     


 


8/22/17 19:15  69 15     


 


8/22/17 19:00  75 19     


 


8/22/17 18:32  74      


 


8/22/17 16:22 36.7 83 16 107/75 92 Room Air  











Physical Exam


GENERAL: Patient is a healthy-appearing well-nourished 


HEAD: Normocephalic atraumatic


EYES: Ocular movements intact pupils equal and react to light


OROPHARYNX mucous membranes are moist no exudates present no erythema or edema 

present


NECK: Supple no nuchal rigidity


CHEST: Good equal expansion


LUNGS: Clear and equal to auscultation


CARDIAC: Normal S1 and S2


ABDOMEN: Soft nontender no guarding


BACK: No CVA tenderness


EXTREMITIES: No pain upon palpation normal muscle strength in all groups no 

clubbing cyanosis or edema


NEURO: Patient is following commands and answering questions appropriately. 

Alert and oriented x3 Cranial Nerves 2-12 grossly intact





Medical Decision & Procedures


ER Provider


Diagnostic Interpretation:


Radiology results as stated below per my review and radiologist interpretation:





CT OF THE ABDOMEN AND PELVIS WITH CONTRAST





CLINICAL HISTORY: Epigastric pain.    





COMPARISON STUDY:  Right upper quadrant ultrasound January 1, 2014 and abdominal


series May 3, 2017.





TECHNIQUE: Following IV administration of 93 mL of Optiray-320, axial images of


the abdomen and pelvis were obtained from the lung bases to the proximal femurs.


Images were reviewed in the axial, sagittal, and coronal planes. IV contrast was


administered without complication.  A dose lowering technique was utilized


adhering to the principles of ALARA.








CT DOSE: 629.90 mGy.cm





FINDINGS: The lung bases are clear. No pneumatosis, free air or portal venous


gas is present. There is probable fatty infiltration of the liver. No biliary


ductal dilatation is present status post cholecystectomy. No hepatic lesions are


present. The spleen, adrenal glands, kidneys and pancreas are normal. There is


no pancreatic ductal dilatation or peripancreatic infiltration. The caliber and


wall thickness of small and large bowel are normal. The appendix is normal.


There is no hydronephrosis. No lymphadenopathy is present. Ovaries are not


significantly enlarged. There is trace fluid within the pelvis which is likely


physiologic. No suspicious skeletal lesions are identified. Major vasculature of


the abdomen and pelvis is patent.











IMPRESSION:  





1. No acute process within the abdomen or pelvis. Normal appendix.





2. No biliary ductal dilatation status post cholecystectomy.





3. Trace free pelvic fluid which is likely physiologic.











Electronically signed by:  Sanket Alonzo M.D.


8/22/2017 8:21 PM





Dictated Date/Time:  8/22/2017 8:16 PM





Laboratory Results


8/22/17 18:36








Red Blood Count 4.99, Mean Corpuscular Volume 91.8, Mean Corpuscular Hemoglobin 

30.5, Mean Corpuscular Hemoglobin Concent 33.2, Mean Platelet Volume 10.6, 

Neutrophils (%) (Auto) 61.3, Lymphocytes (%) (Auto) 32.5, Monocytes (%) (Auto) 

4.8, Eosinophils (%) (Auto) 1.2, Basophils (%) (Auto) 0.1, Neutrophils # (Auto) 

4.12, Lymphocytes # (Auto) 2.19, Monocytes # (Auto) 0.32, Eosinophils # (Auto) 

0.08, Basophils # (Auto) 0.01





8/22/17 18:36

















Test


  8/22/17


18:36 8/22/17


18:42 8/22/17


19:40


 


White Blood Count


  6.73 K/uL


(4.8-10.8) 


  


 


 


Red Blood Count


  4.99 M/uL


(4.2-5.4) 


  


 


 


Hemoglobin


  15.2 g/dL


(12.0-16.0) 


  


 


 


Hematocrit 45.8 % (37-47)   


 


Mean Corpuscular Volume


  91.8 fL


() 


  


 


 


Mean Corpuscular Hemoglobin


  30.5 pg


(25-34) 


  


 


 


Mean Corpuscular Hemoglobin


Concent 33.2 g/dl


(32-36) 


  


 


 


Platelet Count


  199 K/uL


(130-400) 


  


 


 


Mean Platelet Volume


  10.6 fL


(7.4-10.4) 


  


 


 


Neutrophils (%) (Auto) 61.3 %   


 


Lymphocytes (%) (Auto) 32.5 %   


 


Monocytes (%) (Auto) 4.8 %   


 


Eosinophils (%) (Auto) 1.2 %   


 


Basophils (%) (Auto) 0.1 %   


 


Neutrophils # (Auto)


  4.12 K/uL


(1.4-6.5) 


  


 


 


Lymphocytes # (Auto)


  2.19 K/uL


(1.2-3.4) 


  


 


 


Monocytes # (Auto)


  0.32 K/uL


(0.11-0.59) 


  


 


 


Eosinophils # (Auto)


  0.08 K/uL


(0-0.5) 


  


 


 


Basophils # (Auto)


  0.01 K/uL


(0-0.2) 


  


 


 


RDW Standard Deviation


  41.4 fL


(36.4-46.3) 


  


 


 


RDW Coefficient of Variation


  12.3 %


(11.5-14.5) 


  


 


 


Immature Granulocyte % (Auto) 0.1 %   


 


Immature Granulocyte # (Auto)


  0.01 K/uL


(0.00-0.02) 


  


 


 


Est Creatinine Clear Calc


Drug Dose 97.9 ml/min 


  


  


 


 


Estimated GFR (


American) 88.2 


  


  


 


 


Estimated GFR (Non-


American 76.1 


  


  


 


 


BUN/Creatinine Ratio 5.6 (10-20)   


 


Calcium Level


  9.4 mg/dl


(8.5-10.1) 


  


 


 


Total Bilirubin


  0.7 mg/dl


(0.2-1) 


  


 


 


Direct Bilirubin


  0.2 mg/dl


(0-0.2) 


  


 


 


Aspartate Amino Transf


(AST/SGOT) 36 U/L (15-37) 


  


  


 


 


Alanine Aminotransferase


(ALT/SGPT) 60 U/L (12-78) 


  


  


 


 


Alkaline Phosphatase


  60 U/L


() 


  


 


 


Troponin I


  < 0.015 ng/ml


(0-0.045) 


  


 


 


Total Protein


  8.1 gm/dl


(6.4-8.2) 


  


 


 


Albumin


  4.3 gm/dl


(3.4-5.0) 


  


 


 


Lipase


  160 U/L


() 


  


 


 


Bedside Hemoglobin


  


  16.0 g/dl


(12.0-16.0) 


 


 


Bedside Hematocrit  47 % (37-47)  


 


Bedside Sodium


  


  142 mEq/L


(135-144) 


 


 


Bedside Potassium


  


  3.4 mEq/L


(3.3-5.0) 


 


 


Bedside Chloride


  


  104 mEq/L


(101-112) 


 


 


Bedside Total CO2


  


  25 mEq/l


(24-31) 


 


 


Anion Gap


  


  17.0 mmol/L


(16-25) 


 


 


Bedside Blood Urea Nitrogen  4 mg/dl (7-18)  


 


Bedside Creatinine


  


  0.9 mg/dl


(0.6-1.3) 


 


 


Bedside Glucose (other)


  


  81 mg/dl


(70-99) 


 


 


Bedside Ionized Calcium (Ashley)


  


  1.24 mmol/l


(1.12-1.32) 


 


 


Urine Color   YELLOW 


 


Urine Appearance   CLEAR (CLEAR) 


 


Urine pH   6.5 (4.5-7.5) 


 


Urine Specific Gravity


  


  


  1.010


(1.000-1.030)


 


Urine Protein   NEG (NEG) 


 


Urine Glucose (UA)   NEG (NEG) 


 


Urine Ketones   TRACE (NEG) 


 


Urine Occult Blood   NEG (NEG) 


 


Urine Nitrite   NEG (NEG) 


 


Urine Bilirubin   NEG (NEG) 


 


Urine Urobilinogen   NEG (NEG) 


 


Urine Leukocyte Esterase   NEG (NEG) 


 


Urine Pregnancy Test   NEG (NEG) 





Labs reviewed by ED physician.





Medications Administered











 Medications


  (Trade)  Dose


 Ordered  Sig/Nicolasa


 Route  Start Time


 Stop Time Status Last Admin


Dose Admin


 


 Famotidine


  (Pepcid Tab)  20 mg  NOW  STAT


 PO  8/22/17 18:02


 8/22/17 18:05 DC 8/22/17 19:51


20 MG


 


 Sucralfate


  (Carafate Tab)  1 gm  NOW  STAT


 PO  8/22/17 18:02


 8/22/17 18:05 DC 8/22/17 19:51


1 GM


 


 Sodium Chloride  1,000 ml @ 


 999 mls/hr  Q1H1M STAT


 IV  8/22/17 18:02


 8/22/17 19:02 DC 8/22/17 19:05


999 MLS/HR


 


 Al Hydroxide/Mg


 Hydroxide


  (Maalox Susp)  30 ml  STK-MED ONCE


 .ROUTE  8/22/17 19:49


 8/22/17 19:50 DC 8/22/17 19:52


30 ML


 


 Lidocaine HCl


  (Viscous


 Lidocaine 2% Soln)  20 ml  STK-MED ONCE


 .ROUTE  8/22/17 19:49


 8/22/17 19:50 DC 8/22/17 19:52


20 ML











ECG


Indication:  abdominal pain


Rate (beats per minute):  57


Rhythm:  sinus bradycardia


Findings:  no acute ischemic change, no ectopy





ED Course


1758: Past medical records reviewed. The patient was evaluated in room B9. A 

complete history and physical examination was performed. 





1802: Ordered NSS 1000 ml @ 999 mls/hr IV, Sucralfate 1 gm PO, Famotidine 20 mg 

PO, GI Cocktail 24 ml PO.





1949: Ordered Lidocaine HCl 20 ml .ROUTE, Maalox Susp 30 ml .ROUTE.





2035: Upon reexamination the patient is resting comfortably. I discussed 

results and treatment plan with the patient. She verbalizes agreement and 

understanding. The patient is ready for discharge.





Medical Decision


Differential diagnosis:


Etiologies such as appendicitis, diverticulitis, PUD, biliary pathology, UTI, 

pancreatitis, obstruction, mesenteric ischemia, aortic pathology, infections, 

inflammatory bowel disease, renal colic, as well as others were entertained. 





This is a 27-year-old female that presents emergency department complaining of 

epigastric pain.  The pain was relieved by a GI cocktail Pepcid and Carafate.  

In addition the patient as well as CBC normal renal profile normal liver 

profile normal lipase.  CT the abdomen pelvis does not show any acute process.  

Based on the fact that the pain was relieved by GI cocktail I feel that the 

patient is most likely suffering from GERD and I recommended follow-up with 

gastroenterology.  In addition I recommended a clear liquid diet for the next 

48 hours.  Patient was in agreement with the treatment plan.





[place] Drug Monitoring Program


Search Results:  patient reviewed within database


Drug Monitoring Findings:


The patient had Oxycodone filled in May.





Impression





 Primary Impression:  


 Epigastric pain





Scribe Attestation


The scribe's documentation has been prepared under my direction and personally 

reviewed by me in its entirety. I confirm that the note above accurately 

reflects all work, treatment, procedures, and medical decision making performed 

by me.





Departure Information


Dispostion


Home / Self-Care





Prescriptions





Oxycodone/Acetaminophen 5MG/325MG (PERCOCET 5MG/325MG)  Tab


1-2 TAB PO Q4H Y for Pain, #14 TAB


   Prov: Terry Trujillo MD         8/22/17





Referrals


SILVESTRE West M.D. (MEDICAL) (PCP)





Forms


HOME CARE DOCUMENTATION FORM,                                                 

               IMPORTANT VISIT INFORMATION





Patient Instructions


My [facility] [place] [employer]





Additional Instructions





Take 5 ml Maalox before every meal 


Clear liquid diet next 48 hours


Follow up with DR Ramesh's office





You received narcotic or benzodiazepene medication while in the emergency room 

today.  Do not drive, operate heavy machinery, or drink alcohol under the 

influence of this medication.  














You have been examined and treated today on an emergency basis only. This is 

not a substitute for, or an effort to provide, complete comprehensive medical 

care. It is impossible to recognize and treat all injuries or illnesses in a 

single emergency department visit. It is therefore important that you follow up 

closely with Dr West.  Call as soon as possible for an appointment.  





Thank you for your time and consideration.  I look forward to speaking with you 

again soon.  Please don't hesitate to call us if you have any questions.

## 2017-08-22 NOTE — DIAGNOSTIC IMAGING REPORT
CT OF THE ABDOMEN AND PELVIS WITH CONTRAST



CLINICAL HISTORY: Epigastric pain.    



COMPARISON STUDY:  Right upper quadrant ultrasound January 1, 2014 and abdominal

series May 3, 2017.



TECHNIQUE: Following IV administration of 93 mL of Optiray-320, axial images of

the abdomen and pelvis were obtained from the lung bases to the proximal femurs.

Images were reviewed in the axial, sagittal, and coronal planes. IV contrast was

administered without complication.  A dose lowering technique was utilized

adhering to the principles of ALARA.





CT DOSE: 629.90 mGy.cm



FINDINGS: The lung bases are clear. No pneumatosis, free air or portal venous

gas is present. There is probable fatty infiltration of the liver. No biliary

ductal dilatation is present status post cholecystectomy. No hepatic lesions are

present. The spleen, adrenal glands, kidneys and pancreas are normal. There is

no pancreatic ductal dilatation or peripancreatic infiltration. The caliber and

wall thickness of small and large bowel are normal. The appendix is normal.

There is no hydronephrosis. No lymphadenopathy is present. Ovaries are not

significantly enlarged. There is trace fluid within the pelvis which is likely

physiologic. No suspicious skeletal lesions are identified. Major vasculature of

the abdomen and pelvis is patent.







IMPRESSION:  



1. No acute process within the abdomen or pelvis. Normal appendix.



2. No biliary ductal dilatation status post cholecystectomy.



3. Trace free pelvic fluid which is likely physiologic.







Electronically signed by:  Sanket Alonzo M.D.

8/22/2017 8:21 PM



Dictated Date/Time:  8/22/2017 8:16 PM

## 2018-04-08 ENCOUNTER — HOSPITAL ENCOUNTER (EMERGENCY)
Dept: HOSPITAL 45 - C.EDB | Age: 28
Discharge: HOME | End: 2018-04-08
Payer: COMMERCIAL

## 2018-04-08 VITALS — DIASTOLIC BLOOD PRESSURE: 70 MMHG | HEART RATE: 88 BPM | OXYGEN SATURATION: 97 % | SYSTOLIC BLOOD PRESSURE: 110 MMHG

## 2018-04-08 VITALS — TEMPERATURE: 98.06 F

## 2018-04-08 VITALS
HEIGHT: 67.01 IN | BODY MASS INDEX: 32.18 KG/M2 | BODY MASS INDEX: 32.18 KG/M2 | WEIGHT: 205.03 LBS | WEIGHT: 205.03 LBS | HEIGHT: 67.01 IN

## 2018-04-08 DIAGNOSIS — M54.5: ICD-10-CM

## 2018-04-08 DIAGNOSIS — S39.012A: Primary | ICD-10-CM
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
## 2018-04-08 DIAGNOSIS — F17.200: ICD-10-CM
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
## 2018-04-08 NOTE — DIAGNOSTIC IMAGING REPORT
L-SPINE MIN 4 VIEWS ROUTINE



CLINICAL HISTORY: Low back pain with left leg radiculopathy.    



COMPARISON STUDY:  May 28, 2017



FINDINGS: There are surgical clips within the right upper quadrant consistent

with a prior cholecystectomy. There are mildly prominent central left mid

abdominal small bowel loops likely representing ileus. No acute fractures are

visualized. There is disc space narrowing at the L5-S1 level. There is minimal

retrolisthesis of L5 and S1. The findings remain unchanged the preceding study.



IMPRESSION:  

1. No acute fractures or traumatic subluxations

2. Mild degenerative changes the L5-S1 level

3. Mild small bowel prominence, likely secondary to a mild ileus 









Electronically signed by:  Rafael Sarmiento M.D.

4/8/2018 1:39 PM



Dictated Date/Time:  4/8/2018 1:37 PM

## 2018-04-08 NOTE — EMERGENCY ROOM VISIT NOTE
History


First contact with patient:  13:02


Chief Complaint:  BACK PAIN


Stated Complaint:  LOWER BACK PAIN,HARD TO STAND UP RIGHT,PAIN L LEG





History of Present Illness


The patient is a 28 year old female who presents to the Emergency Room with 

complaints of low back pain that started last night at work.  The patient was 

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 

radiating down the central aspect of her left leg to her toes.  The patient 

denies any chronic back pain.  She describes the pain as a burning sensation.  

It is severe in nature.  She has tried Tylenol with no relief of the pain.  She 

denies any weakness in her lower extremities.  No urinary or bowel incontinence.





Review of Systems


6 system review negative. Please see pertinent positives in the history of 

present illness section.





Past Medical/Surgical History


Medical Problems:


(1) No significant past medical history


Surgical Problems:


(1) History of cholecystectomy


(2) History of tubal ligation








Family History





Adopted





Social History


Smoking Status:  Current Every Day Smoker


Alcohol Use:  none


Drug Use:  none


Marital Status:  


Housing Status:  lives with family


Occupation Status:  employed, student





Current/Historical Medications


Scheduled


Acetaminophen (Tylenol), 1,000 MG PO UD


Cyclobenzaprine Hcl (Flexeril), 10 MG PO TID


Methylprednisolone (Medrol Dosepak), 0 PO DAILY





Scheduled PRN


Hydrocodone/Acetaminophen 5MG/325MG (Norco 5MG/325MG), 1-2 TABLET PO Q4H PRN 

for Pain





Physical Exam


Vital Signs











  Date Time  Temp Pulse Resp B/P (MAP) Pulse Ox O2 Delivery O2 Flow Rate FiO2


 


4/8/18 14:57  88 14 110/70 97   


 


4/8/18 12:20 36.7 100 16 109/66 96   











Physical Exam


VITALS: Vitals are noted on the nurse's note and reviewed by myself.  Vital 

signs stable.


GENERAL: 28-year-old female, mildly uncomfortable,


SKIN: The skin was without rashes, erythema, edema, or bruising.


HEAD: Normocephalic atraumatic.  


NECK: Supple without nuchal rigidity.  Cervical spine is nontender.  Full range 

of motion of the neck


HEART: Regular rate and rhythm without murmurs gallops or rubs.


LUNGS: Clear to auscultation bilaterally without wheezes, rales or rhonchi.   

No accessory muscle use.


MUSCULOSKELETAL: No muscle atrophy, erythema, or edema noted.  Tenderness to 

palpation over the lumbar spinous processes.  Mild tenderness over the left 

paraspinous muscle.  No tenderness over the SI joint.  Mild pain with left leg 

raise.  DP pulse +2 bilaterally..  Strength 5/5 throughout.


NEURO: Patient was alert and oriented to person place and time.  Normal 

sensation to touch. No focal neurological deficits.





Medical Decision & Procedures


ER Provider


Diagnostic Interpretation:


Lumbar x-rays


IMPRESSION:  


1. No acute fractures or traumatic subluxations


2. Mild degenerative changes the L5-S1 level


3. Mild small bowel prominence, likely secondary to a mild ileus 














Electronically signed by:  Rafael Sarmiento M.D.


4/8/2018 1:39 PM





Dictated Date/Time:  4/8/2018 1:37 PM





Medications Administered











 Medications


  (Trade)  Dose


 Ordered  Sig/Nicolasa


 Route  Start Time


 Stop Time Status Last Admin


Dose Admin


 


 Ketorolac


 Tromethamine


  (Toradol Inj)  60 mg  ONE  ONCE


 IM  4/8/18 13:15


 4/8/18 13:16 DC 4/8/18 13:33


60 MG


 


 Dexamethasone


 Sodium Phosphate


  (Decadron Inj)  10 mg  NOW  STAT


 IM  4/8/18 13:11


 4/8/18 13:13 DC 4/8/18 13:33


10 MG


 


 Acetaminophen


  (Tylenol Tab)  1,000 mg  NOW  STAT


 PO  4/8/18 13:11


 4/8/18 13:13 DC 4/8/18 13:32


1,000 MG











ED Course


The patient was seen and examined


She was medicated with Toradol and Decadron IM.  She was also given Tylenol 1 g 

p.o.


Imaging was performed and reviewed


Upon reevaluation, the patient was feeling better.  We discussed the results of 

her imaging.  She voiced understanding, was comfortable being discharged home.


Discharge instructions were reviewed, and she was discharged in good condition





Medical Decision


Differential diagnosis: Spine fracture, ligamentous injury, subluxation, 

spondylolisthesis, spondylosis, herniated disc, contusion, muscle spasm, cauda 

equina





This patient is a 28-year-old female that presents to the emergency department 

complaining of low back pain down the left leg resulting from an injury at work 

last night.  On exam, she was uncomfortable.  She was neurovascularly intact.  

No weakness.  I do not suspect cauda equina syndrome.  The patient had good 

symptomatically for the emergency department.  I believe she is stable to be 

discharged home.  She does have some disc space narrowing at L5-S1.  She will 

be treated with a short course of narcotics, muscle relaxants and steroids.  

She was advised to follow-up with her primary care physician for further 

treatment, and agrees to return with worsening symptoms.





This chart was completed in part utilizing Dragon Speech Voice Recognition 

software. Attempts were made to minimize the grammatical errors, random word 

insertions, pronoun errors and incomplete sentences. Any formal questions or 

concerns about the content, text or information contained within the body of 

this dictation should be directly addressed to the provider for clarification.





Medication Reconcilliation


Current Medication List:  was personally reviewed by me





Blood Pressure Screening


Patient's blood pressure:  Normal blood pressure





Impression





 Primary Impression:  


 Strain of lumbar region





Departure Information


Dispostion


Home / Self-Care





Condition


GOOD





Prescriptions





Methylprednisolone (MEDROL DOSEPAK) 4 Mg Kasi


0 PO DAILY, #1 PKT


   Prov: Sariah Zamora PA-C         4/8/18 


Cyclobenzaprine Hcl (FLEXERIL) 10 Mg Tab


10 MG PO TID for Muscle Spasms, #15 TAB


   Prov: Sariah Zamora PA-C         4/8/18 


Hydrocodone/Acetaminophen 5MG/325MG (Norco 5MG/325MG)  Tab


1-2 TABLET PO Q4H Y for Pain, #15 TAB


   For Initial Treatment


   Prov: Sariah Zamora PA-C         4/8/18





Referrals


SILVESTRE Wets M.D. (MEDICAL) (PCP)





Patient Instructions


Back Pain Relieve, My [facility]





Additional Instructions





You have been evaluated in the emergency department for back pain.  Imaging 

shows some disc space narrowing at L5-S1.





Ibuprofen 600 mg every 6 hours


Norco 1-2 tabs every 4 hours for severe pain.  Do not drink alcohol or drive  

while taking this medication.  This may be taken with ibuprofen, but avoid 

Tylenol.








Flexeril every 8 hours as needed for muscle spasms.  Please do not drink 

alcohol or drive or taking this medication.





Ice over the lower back over the next 48 hours.  Lying on a hard surface may 

help with the pain.  No strenuous activity until your back is feeling better





Return to the emergency department if you have any of the following symptoms:


-Problems with urination


-Weakness in your legs


-Chest pain


-Shortness of breath


-Worsening pain





Please follow-up with your primary care physician next 2-3 days.





Work Instructions


Return To Work:  2 days

## 2018-05-21 ENCOUNTER — HOSPITAL ENCOUNTER (OUTPATIENT)
Dept: HOSPITAL 45 - C.RDSM | Age: 28
Discharge: HOME | End: 2018-05-21
Attending: ORTHOPAEDIC SURGERY
Payer: COMMERCIAL

## 2018-05-21 DIAGNOSIS — M54.5: Primary | ICD-10-CM
